# Patient Record
Sex: MALE
[De-identification: names, ages, dates, MRNs, and addresses within clinical notes are randomized per-mention and may not be internally consistent; named-entity substitution may affect disease eponyms.]

---

## 2019-01-08 ENCOUNTER — HOSPITAL ENCOUNTER (EMERGENCY)
Dept: HOSPITAL 46 - ED | Age: 39
Discharge: HOME | End: 2019-01-08
Payer: COMMERCIAL

## 2019-01-08 VITALS — WEIGHT: 220 LBS | HEIGHT: 72 IN | BODY MASS INDEX: 29.8 KG/M2

## 2019-01-08 DIAGNOSIS — F17.200: ICD-10-CM

## 2019-01-08 DIAGNOSIS — E11.65: Primary | ICD-10-CM

## 2019-01-08 DIAGNOSIS — Z79.84: ICD-10-CM

## 2019-01-08 DIAGNOSIS — Z91.19: ICD-10-CM

## 2019-01-08 NOTE — XMS
PreManage Notification: CAPRICE AUGUSTINE MRN:[id number]
 
Security Information
 
Security Events
No recent Security Events currently on file
 
 
 
CRITERIA MET
------------
- [facility] - 2 Visits in 30 Days
 
 
CARE PROVIDERS
-------------------------------------------------------------------------------------
SARBJIT-OFEI, REBEKAH      Pediatrics     Current
 
PHONE: Unknown
-------------------------------------------------------------------------------------
[facility]      Primary Care     Current
- [facility]
 
PHONE: [phone]
-------------------------------------------------------------------------------------
Zahra Leung     Primary Care     Current
 
PHONE: Unknown
-------------------------------------------------------------------------------------
 
Risa has no Care Guidelines for this patient.
 
E.D. VISIT COUNT (12 MO.)
-------------------------------------------------------------------------------------
2 Sherry Gonzales
 
1 YOSEF Lambert
-------------------------------------------------------------------------------------
TOTAL 3
-------------------------------------------------------------------------------------
NOTE: Visits indicate total known visits.
 
ED/UCC VISIT TRACKING (12 MO.)
-------------------------------------------------------------------------------------
01/08/2019 18:02
[facility] OR
 
TYPE: Emergency
 
COMPLAINT:
- DIABETIC PROBLEM
-------------------------------------------------------------------------------------
01/04/2019 22:14
[facility]       Janet [place]
 
TYPE: Emergency
 
 
 
-------------------------------------------------------------------------------------
01/01/2019 04:30
[facility]       [place]
 
TYPE: [place]
 
 
-------------------------------------------------------------------------------------
 
 
INPATIENT VISIT TRACKING (12 MO.)
No inpatient visits to display in this time frame
 
[email or web address]